# Patient Record
Sex: FEMALE | Race: WHITE | NOT HISPANIC OR LATINO | Employment: OTHER | RURAL
[De-identification: names, ages, dates, MRNs, and addresses within clinical notes are randomized per-mention and may not be internally consistent; named-entity substitution may affect disease eponyms.]

---

## 2023-03-16 ENCOUNTER — HOSPITAL ENCOUNTER (EMERGENCY)
Facility: HOSPITAL | Age: 33
Discharge: HOME | End: 2023-03-16
Payer: MEDICAID

## 2023-03-16 VITALS
SYSTOLIC BLOOD PRESSURE: 131 MMHG | DIASTOLIC BLOOD PRESSURE: 58 MMHG | RESPIRATION RATE: 17 BRPM | TEMPERATURE: 98 F | HEIGHT: 65 IN | BODY MASS INDEX: 24.46 KG/M2 | OXYGEN SATURATION: 100 % | HEART RATE: 89 BPM | WEIGHT: 146.81 LBS

## 2023-03-16 DIAGNOSIS — S61.501A OPEN WOUND OF RIGHT WRIST, INITIAL ENCOUNTER: Primary | ICD-10-CM

## 2023-03-16 PROCEDURE — 99284 PR EMERGENCY DEPT VISIT,LEVEL IV: ICD-10-PCS | Mod: ,,, | Performed by: NURSE PRACTITIONER

## 2023-03-16 PROCEDURE — 25000003 PHARM REV CODE 250: Performed by: NURSE PRACTITIONER

## 2023-03-16 PROCEDURE — 99284 EMERGENCY DEPT VISIT MOD MDM: CPT

## 2023-03-16 PROCEDURE — 99284 EMERGENCY DEPT VISIT MOD MDM: CPT | Mod: ,,, | Performed by: NURSE PRACTITIONER

## 2023-03-16 RX ORDER — CLINDAMYCIN HYDROCHLORIDE 300 MG/1
300 CAPSULE ORAL EVERY 8 HOURS
Qty: 30 CAPSULE | Refills: 0 | Status: SHIPPED | OUTPATIENT
Start: 2023-03-16 | End: 2023-05-14 | Stop reason: CLARIF

## 2023-03-16 RX ORDER — MUPIROCIN 20 MG/G
OINTMENT TOPICAL 3 TIMES DAILY
Qty: 30 G | Refills: 0 | Status: SHIPPED | OUTPATIENT
Start: 2023-03-16 | End: 2023-05-14 | Stop reason: CLARIF

## 2023-03-16 RX ADMIN — BACITRACIN ZINC, NEOMYCIN, POLYMYXIN B: 400; 3.5; 5 OINTMENT TOPICAL at 09:03

## 2023-03-16 NOTE — ED NOTES
Pt discharged home with wound care and medication directions. Pt wound was dressed with neosporin, covered with telfa and covered with kerlix.  Pt instructed to take all antibiotics until gone and follow up with PCP or return to ER for any s/s of infection.

## 2023-03-16 NOTE — ED TRIAGE NOTES
"32 year old female presents to ER with c/o "possible mosquito bite" to right forearm close to wrist. Large open wound noted to area. Pt states it festered up and she mashed it.  "

## 2023-03-16 NOTE — ED PROVIDER NOTES
Encounter Date: 3/16/2023       History   No chief complaint on file.    31 y/o WF presents pov with c/o abscess to right wrist x 2 days.    Review of patient's allergies indicates:  No Known Allergies  History reviewed. No pertinent past medical history.  History reviewed. No pertinent surgical history.  History reviewed. No pertinent family history.  Social History     Tobacco Use    Smoking status: Every Day     Packs/day: 1.00     Types: Cigarettes    Smokeless tobacco: Never   Substance Use Topics    Alcohol use: Not Currently    Drug use: Not Currently     Review of Systems   Respiratory:  Negative for apnea, cough, choking, chest tightness, shortness of breath, wheezing and stridor.    Cardiovascular:  Negative for chest pain, palpitations and leg swelling.   Skin:  Positive for wound.        Right wrist   All other systems reviewed and are negative.    Physical Exam     Initial Vitals [03/16/23 0150]   BP Pulse Resp Temp SpO2   (!) 131/58 89 17 98.4 °F (36.9 °C) 100 %      MAP       --         Physical Exam    Nursing note and vitals reviewed.  Constitutional: She appears well-developed and well-nourished. No distress.   Cardiovascular:  Normal rate, regular rhythm, normal heart sounds and intact distal pulses.     Exam reveals no gallop and no friction rub.       No murmur heard.  Pulmonary/Chest: Breath sounds normal. No respiratory distress. She has no wheezes. She has no rhonchi. She has no rales. She exhibits no tenderness.   Musculoskeletal:         General: Normal range of motion.     Neurological: She is alert and oriented to person, place, and time. She has normal strength.   Skin: Skin is warm and dry. Capillary refill takes less than 2 seconds. Lesion noted.        Psychiatric: She has a normal mood and affect. Her behavior is normal. Judgment and thought content normal.       Medical Screening Exam   See Full Note    ED Course   Procedures  Labs Reviewed - No data to display       Imaging Results     None          Medications   neomycin-bacitracnZn-polymyxnB packet (has no administration in time range)     Medical Decision Making:   Initial Assessment:   31 y/o WF presents pov with c/o abscess to right wrist x 2 days.  Differential Diagnosis:   Abscess of Arm  Staphylococcal Infection  ED Management:  Patient supine on stretcher in ER # 1 in NAD. Patient discharged home. Return precautions and f/u instructions given.                 Clinical Impression:   Final diagnoses:  [S61.501A] Open wound of right wrist, initial encounter (Primary)        ED Disposition Condition    Discharge Stable          ED Prescriptions       Medication Sig Dispense Start Date End Date Auth. Provider    clindamycin (CLEOCIN) 300 MG capsule Take 1 capsule (300 mg total) by mouth every 8 (eight) hours. 30 capsule 3/16/2023 -- EMILY Almeida    mupirocin (BACTROBAN) 2 % ointment Apply topically 3 (three) times daily. 30 g 3/16/2023 -- EMILY Almeida          Follow-up Information    None          EMILY Almeida  03/16/23 0204       EMILY Almeida  03/16/23 0207       EMILY Almeida  03/16/23 0212

## 2023-03-22 ENCOUNTER — OFFICE VISIT (OUTPATIENT)
Dept: FAMILY MEDICINE | Facility: CLINIC | Age: 33
End: 2023-03-22
Payer: MEDICAID

## 2023-03-22 VITALS
SYSTOLIC BLOOD PRESSURE: 127 MMHG | OXYGEN SATURATION: 100 % | HEART RATE: 104 BPM | BODY MASS INDEX: 24.19 KG/M2 | TEMPERATURE: 97 F | RESPIRATION RATE: 20 BRPM | DIASTOLIC BLOOD PRESSURE: 73 MMHG | WEIGHT: 145.19 LBS | HEIGHT: 65 IN

## 2023-03-22 DIAGNOSIS — R00.0 TACHYCARDIA: ICD-10-CM

## 2023-03-22 DIAGNOSIS — T14.8XXA WOUND, OPEN: Primary | ICD-10-CM

## 2023-03-22 PROCEDURE — 1160F RVW MEDS BY RX/DR IN RCRD: CPT | Mod: CPTII,,, | Performed by: STUDENT IN AN ORGANIZED HEALTH CARE EDUCATION/TRAINING PROGRAM

## 2023-03-22 PROCEDURE — 3074F SYST BP LT 130 MM HG: CPT | Mod: CPTII,,, | Performed by: STUDENT IN AN ORGANIZED HEALTH CARE EDUCATION/TRAINING PROGRAM

## 2023-03-22 PROCEDURE — 1159F MED LIST DOCD IN RCRD: CPT | Mod: CPTII,,, | Performed by: STUDENT IN AN ORGANIZED HEALTH CARE EDUCATION/TRAINING PROGRAM

## 2023-03-22 PROCEDURE — 3078F PR MOST RECENT DIASTOLIC BLOOD PRESSURE < 80 MM HG: ICD-10-PCS | Mod: CPTII,,, | Performed by: STUDENT IN AN ORGANIZED HEALTH CARE EDUCATION/TRAINING PROGRAM

## 2023-03-22 PROCEDURE — 1160F PR REVIEW ALL MEDS BY PRESCRIBER/CLIN PHARMACIST DOCUMENTED: ICD-10-PCS | Mod: CPTII,,, | Performed by: STUDENT IN AN ORGANIZED HEALTH CARE EDUCATION/TRAINING PROGRAM

## 2023-03-22 PROCEDURE — 3074F PR MOST RECENT SYSTOLIC BLOOD PRESSURE < 130 MM HG: ICD-10-PCS | Mod: CPTII,,, | Performed by: STUDENT IN AN ORGANIZED HEALTH CARE EDUCATION/TRAINING PROGRAM

## 2023-03-22 PROCEDURE — 3078F DIAST BP <80 MM HG: CPT | Mod: CPTII,,, | Performed by: STUDENT IN AN ORGANIZED HEALTH CARE EDUCATION/TRAINING PROGRAM

## 2023-03-22 PROCEDURE — 3008F BODY MASS INDEX DOCD: CPT | Mod: CPTII,,, | Performed by: STUDENT IN AN ORGANIZED HEALTH CARE EDUCATION/TRAINING PROGRAM

## 2023-03-22 PROCEDURE — 99213 PR OFFICE/OUTPT VISIT, EST, LEVL III, 20-29 MIN: ICD-10-PCS | Mod: ,,, | Performed by: STUDENT IN AN ORGANIZED HEALTH CARE EDUCATION/TRAINING PROGRAM

## 2023-03-22 PROCEDURE — 1159F PR MEDICATION LIST DOCUMENTED IN MEDICAL RECORD: ICD-10-PCS | Mod: CPTII,,, | Performed by: STUDENT IN AN ORGANIZED HEALTH CARE EDUCATION/TRAINING PROGRAM

## 2023-03-22 PROCEDURE — 3008F PR BODY MASS INDEX (BMI) DOCUMENTED: ICD-10-PCS | Mod: CPTII,,, | Performed by: STUDENT IN AN ORGANIZED HEALTH CARE EDUCATION/TRAINING PROGRAM

## 2023-03-22 PROCEDURE — 99213 OFFICE O/P EST LOW 20 MIN: CPT | Mod: ,,, | Performed by: STUDENT IN AN ORGANIZED HEALTH CARE EDUCATION/TRAINING PROGRAM

## 2023-03-23 PROBLEM — R00.0 TACHYCARDIA: Status: ACTIVE | Noted: 2023-03-23

## 2023-03-23 PROBLEM — T14.8XXA WOUND, OPEN: Status: ACTIVE | Noted: 2023-03-23

## 2023-03-23 NOTE — PROGRESS NOTES
"Subjective:       Patient ID: Rosalinda Hennessy is a 32 y.o. female.    Chief Complaint: Follow-up (Pt went to the ER at [facility] for insect bite on right wrist on 3-16-23)    HPI    32-year-old with ulcerated wound on her right posterior forearm; recently seen in [facility] ED and given IM abx, clindamycin, and polymicrobial ointment. She reports it has improved substantially, but it is still not healed.    She denies any fever or chills but is slightly tachycardic.    She denies any illicit substance use.         Objective:    /73 (BP Location: Right arm, Patient Position: Sitting, BP Method: Large (Automatic))   Pulse 104   Temp 97.4 °F (36.3 °C) (Oral)   Resp 20   Ht 5' 5" (1.651 m)   Wt 65.9 kg (145 lb 3.2 oz)   LMP 03/16/2023 (Exact Date)   SpO2 100%   BMI 24.16 kg/m²     Physical Exam    Gen: well-groomed, well-dressed. No apparent distress  HEENT:  NCAT, symmetric  Pulm: normal work of breathing, no accessory muscle use; speaking complete sentences without having to stop  Neuro: CN II-XII grossly normal   Psych: pleasant affect, congruent mood. Linear thought; good eye contact  SKIN: ulcerated wound on her right posterior forearm with overlying ointment; granulation tissue present in wound base with minimal macerated tissue or necrotic tissue. Minimal surrounding erythema.     Assessment:       Problem List Items Addressed This Visit    None  Visit Diagnoses       Wound, open    -  Primary    Relevant Orders    Ambulatory referral/consult to Wound Clinic              Plan:       Unclear etiology but she states this was due to picking. Appears to be healing. Continue clindamycin and daily dressing changes. Supplies provided. Recommend following up with [facility] for wound care until it has healed completely.    For her tachycardia, recommended home monitoring and follow-up if this does not resolve. She thinks it may be due to anxiety.    "

## 2023-05-14 ENCOUNTER — HOSPITAL ENCOUNTER (EMERGENCY)
Facility: HOSPITAL | Age: 33
Discharge: HOME | End: 2023-05-14
Attending: EMERGENCY MEDICINE
Payer: MEDICAID

## 2023-05-14 VITALS
BODY MASS INDEX: 25.99 KG/M2 | OXYGEN SATURATION: 99 % | HEIGHT: 65 IN | WEIGHT: 156 LBS | DIASTOLIC BLOOD PRESSURE: 49 MMHG | TEMPERATURE: 98 F | SYSTOLIC BLOOD PRESSURE: 123 MMHG | HEART RATE: 94 BPM | RESPIRATION RATE: 18 BRPM

## 2023-05-14 DIAGNOSIS — G43.009 MIGRAINE WITHOUT AURA AND WITHOUT STATUS MIGRAINOSUS, NOT INTRACTABLE: Primary | ICD-10-CM

## 2023-05-14 PROCEDURE — 96372 THER/PROPH/DIAG INJ SC/IM: CPT | Performed by: EMERGENCY MEDICINE

## 2023-05-14 PROCEDURE — 99284 EMERGENCY DEPT VISIT MOD MDM: CPT

## 2023-05-14 PROCEDURE — 63600175 PHARM REV CODE 636 W HCPCS: Performed by: EMERGENCY MEDICINE

## 2023-05-14 PROCEDURE — 99284 PR EMERGENCY DEPT VISIT,LEVEL IV: ICD-10-PCS | Mod: ,,, | Performed by: EMERGENCY MEDICINE

## 2023-05-14 PROCEDURE — 99284 EMERGENCY DEPT VISIT MOD MDM: CPT | Mod: ,,, | Performed by: EMERGENCY MEDICINE

## 2023-05-14 RX ORDER — PROMETHAZINE HYDROCHLORIDE 25 MG/1
25 SUPPOSITORY RECTAL EVERY 6 HOURS PRN
Qty: 12 SUPPOSITORY | Refills: 0 | Status: SHIPPED | OUTPATIENT
Start: 2023-05-14

## 2023-05-14 RX ORDER — PROMETHAZINE HYDROCHLORIDE 25 MG/1
25 TABLET ORAL EVERY 6 HOURS PRN
Qty: 16 TABLET | Refills: 0 | Status: SHIPPED | OUTPATIENT
Start: 2023-05-14

## 2023-05-14 RX ORDER — PROMETHAZINE HYDROCHLORIDE 25 MG/ML
25 INJECTION, SOLUTION INTRAMUSCULAR; INTRAVENOUS
Status: COMPLETED | OUTPATIENT
Start: 2023-05-14 | End: 2023-05-14

## 2023-05-14 RX ORDER — TRAZODONE HYDROCHLORIDE 100 MG/1
100 TABLET ORAL NIGHTLY
COMMUNITY
End: 2023-07-10 | Stop reason: SDUPTHER

## 2023-05-14 RX ADMIN — PROMETHAZINE HYDROCHLORIDE 25 MG: 25 INJECTION INTRAMUSCULAR; INTRAVENOUS at 07:05
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
## 2023-05-14 NOTE — DISCHARGE INSTRUCTIONS
Take Phenergan as prescribed for nausea or headache.  Return to emergency department for any worsening or further problems.  Follow up in clinic with primary care provider in 2-3 days if symptoms persist.

## 2023-07-10 ENCOUNTER — OFFICE VISIT (OUTPATIENT)
Dept: FAMILY MEDICINE | Facility: CLINIC | Age: 33
End: 2023-07-10
Payer: MEDICAID

## 2023-07-10 VITALS
OXYGEN SATURATION: 99 % | HEART RATE: 73 BPM | SYSTOLIC BLOOD PRESSURE: 101 MMHG | WEIGHT: 158 LBS | BODY MASS INDEX: 26.33 KG/M2 | TEMPERATURE: 98 F | HEIGHT: 65 IN | DIASTOLIC BLOOD PRESSURE: 65 MMHG

## 2023-07-10 DIAGNOSIS — F19.90 SUBSTANCE USE DISORDER: ICD-10-CM

## 2023-07-10 DIAGNOSIS — R51.9 FREQUENT HEADACHES: ICD-10-CM

## 2023-07-10 DIAGNOSIS — F43.9 STRESS: ICD-10-CM

## 2023-07-10 DIAGNOSIS — R45.86 MOOD CHANGES: ICD-10-CM

## 2023-07-10 PROCEDURE — 99213 PR OFFICE/OUTPT VISIT, EST, LEVL III, 20-29 MIN: ICD-10-PCS | Mod: ,,, | Performed by: STUDENT IN AN ORGANIZED HEALTH CARE EDUCATION/TRAINING PROGRAM

## 2023-07-10 PROCEDURE — 3078F PR MOST RECENT DIASTOLIC BLOOD PRESSURE < 80 MM HG: ICD-10-PCS | Mod: CPTII,,, | Performed by: STUDENT IN AN ORGANIZED HEALTH CARE EDUCATION/TRAINING PROGRAM

## 2023-07-10 PROCEDURE — 3074F SYST BP LT 130 MM HG: CPT | Mod: CPTII,,, | Performed by: STUDENT IN AN ORGANIZED HEALTH CARE EDUCATION/TRAINING PROGRAM

## 2023-07-10 PROCEDURE — 1160F PR REVIEW ALL MEDS BY PRESCRIBER/CLIN PHARMACIST DOCUMENTED: ICD-10-PCS | Mod: CPTII,,, | Performed by: STUDENT IN AN ORGANIZED HEALTH CARE EDUCATION/TRAINING PROGRAM

## 2023-07-10 PROCEDURE — 99213 OFFICE O/P EST LOW 20 MIN: CPT | Mod: ,,, | Performed by: STUDENT IN AN ORGANIZED HEALTH CARE EDUCATION/TRAINING PROGRAM

## 2023-07-10 PROCEDURE — 3008F PR BODY MASS INDEX (BMI) DOCUMENTED: ICD-10-PCS | Mod: CPTII,,, | Performed by: STUDENT IN AN ORGANIZED HEALTH CARE EDUCATION/TRAINING PROGRAM

## 2023-07-10 PROCEDURE — 3074F PR MOST RECENT SYSTOLIC BLOOD PRESSURE < 130 MM HG: ICD-10-PCS | Mod: CPTII,,, | Performed by: STUDENT IN AN ORGANIZED HEALTH CARE EDUCATION/TRAINING PROGRAM

## 2023-07-10 PROCEDURE — 1159F MED LIST DOCD IN RCRD: CPT | Mod: CPTII,,, | Performed by: STUDENT IN AN ORGANIZED HEALTH CARE EDUCATION/TRAINING PROGRAM

## 2023-07-10 PROCEDURE — 1160F RVW MEDS BY RX/DR IN RCRD: CPT | Mod: CPTII,,, | Performed by: STUDENT IN AN ORGANIZED HEALTH CARE EDUCATION/TRAINING PROGRAM

## 2023-07-10 PROCEDURE — 3008F BODY MASS INDEX DOCD: CPT | Mod: CPTII,,, | Performed by: STUDENT IN AN ORGANIZED HEALTH CARE EDUCATION/TRAINING PROGRAM

## 2023-07-10 PROCEDURE — 1159F PR MEDICATION LIST DOCUMENTED IN MEDICAL RECORD: ICD-10-PCS | Mod: CPTII,,, | Performed by: STUDENT IN AN ORGANIZED HEALTH CARE EDUCATION/TRAINING PROGRAM

## 2023-07-10 PROCEDURE — 3078F DIAST BP <80 MM HG: CPT | Mod: CPTII,,, | Performed by: STUDENT IN AN ORGANIZED HEALTH CARE EDUCATION/TRAINING PROGRAM

## 2023-07-10 RX ORDER — NALTREXONE HYDROCHLORIDE 50 MG/1
50 TABLET, FILM COATED ORAL DAILY PRN
Qty: 30 TABLET | Refills: 1 | Status: SHIPPED | OUTPATIENT
Start: 2023-07-10

## 2023-07-10 RX ORDER — NALTREXONE HYDROCHLORIDE 50 MG/1
25 TABLET, FILM COATED ORAL DAILY PRN
COMMUNITY
Start: 2023-06-08 | End: 2023-07-10 | Stop reason: SDUPTHER

## 2023-07-10 RX ORDER — VENLAFAXINE 37.5 MG/1
37.5 TABLET ORAL DAILY
Qty: 30 TABLET | Refills: 5 | Status: SHIPPED | OUTPATIENT
Start: 2023-07-10 | End: 2024-07-09

## 2023-07-10 RX ORDER — TRAZODONE HYDROCHLORIDE 100 MG/1
100 TABLET ORAL NIGHTLY
Qty: 30 TABLET | Refills: 1 | Status: SHIPPED | OUTPATIENT
Start: 2023-07-10

## 2023-07-10 NOTE — PROGRESS NOTES
"Subjective     Patient ID: Rosalinda Hennessy is a 32 y.o. female.    Chief Complaint: Medication Refill    HPI    32-year-old woman with the medical problems listed below who comes to clinic for refill of their medication and follow-up for their chronic medical conditions.    She recently received treatment for substance use disorder and reports a past history of methamphetamine use.     She has been taking naltrexone since her discharge and this has helped with her craving.     She is also requesting a refill for venlafaxine and trazodone which she has been taking for mood and sleep. She reports this is helpful.    She has taken prozac and lexapro in the past, neither of which have been substantially helpful.    She was recommended to f/u with psych care at [place] but has unable to set up an appointment yet; she plans to do this.     She also reports a history of headaches. Exacerbated by stress. Has to go lay down and has nausea. Anxiety is a trigger too. Being around a lot of people is a trigger. Pain is occipital. They occur frequently. Will take one excedrin and a BC powder, which may help a bit but doesn't make it go away. Denies any aura.      Objective   /65   Pulse 73   Temp 98.3 °F (36.8 °C) (Oral)   Ht 5' 5" (1.651 m)   Wt 71.7 kg (158 lb)   LMP 07/01/2023 (Approximate)   SpO2 99%   BMI 26.29 kg/m²     Physical Exam  Gen: well-groomed, well-dressed. No apparent distress  HEENT:  NCAT, symmetric  Pulm: normal work of breathing, no accessory muscle use; speaking complete sentences without having to stop  Neuro: CN II-XII grossly normal   Psych: pleasant affect, congruent mood. Linear thought; good eye contact  SKIN: no rashes present on face, neck, hands         Assessment and Plan     Problem List Items Addressed This Visit       Stress    Mood changes    Frequent headaches    Substance use disorder     Stress - contributing to headache; f/u with [place] to discuss; may benefit from CCM  "   Venlafaxine and trazodone are helping; recommend follow-up with [facility] to discuss further treatment  Continue prn NSAIDs / Tylenol; record HA diary. Caution against medication overuse headache. Counseled that her s/s do not suggest migraine so abortive therapy with triptans or other similar unlikely to help. Will continue to monitor, assess for contributing factors, and treat accordingly. Briefly discussed ppx therapy but more investigation is needed to identify type and etiology  Methamphetamine use - in remission; continue natlrexone and f/u with [facility] for continued management

## 2023-07-11 ENCOUNTER — TELEPHONE (OUTPATIENT)
Dept: FAMILY MEDICINE | Facility: CLINIC | Age: 33
End: 2023-07-11
Payer: MEDICAID

## 2023-07-11 NOTE — TELEPHONE ENCOUNTER
Rx change to [facility]-Change Effexor to Effexor XR. Pt. to return Effexor Rx picked up yesterday to [facility].

## 2023-07-12 PROBLEM — R45.86 MOOD CHANGES: Status: ACTIVE | Noted: 2023-07-12

## 2023-07-12 PROBLEM — R51.9 FREQUENT HEADACHES: Status: ACTIVE | Noted: 2023-07-12

## 2023-07-12 PROBLEM — F43.9 STRESS: Status: ACTIVE | Noted: 2023-07-12

## 2023-07-12 PROBLEM — F19.90 SUBSTANCE USE DISORDER: Status: ACTIVE | Noted: 2023-07-12

## 2024-03-15 ENCOUNTER — HOSPITAL ENCOUNTER (EMERGENCY)
Facility: HOSPITAL | Age: 34
Discharge: HOME | End: 2024-03-15
Payer: COMMERCIAL

## 2024-03-15 VITALS
DIASTOLIC BLOOD PRESSURE: 76 MMHG | BODY MASS INDEX: 29.16 KG/M2 | HEART RATE: 100 BPM | WEIGHT: 175 LBS | TEMPERATURE: 98 F | RESPIRATION RATE: 16 BRPM | HEIGHT: 65 IN | OXYGEN SATURATION: 98 % | SYSTOLIC BLOOD PRESSURE: 127 MMHG
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
## 2024-03-15 DIAGNOSIS — S32.10XA CLOSED FRACTURE OF SACRUM, UNSPECIFIED PORTION OF SACRUM, INITIAL ENCOUNTER: Primary | ICD-10-CM

## 2024-03-15 LAB
BASOPHILS # BLD AUTO: 0.04 K/UL (ref 0–0.2)
BASOPHILS NFR BLD AUTO: 0.5 % (ref 0–1)
BILIRUB UR QL STRIP: NEGATIVE
CLARITY UR: CLEAR
COLOR UR: YELLOW
DIFFERENTIAL METHOD BLD: ABNORMAL
EOSINOPHIL # BLD AUTO: 0.06 K/UL (ref 0–0.5)
EOSINOPHIL NFR BLD AUTO: 0.7 % (ref 1–4)
ERYTHROCYTE [DISTWIDTH] IN BLOOD BY AUTOMATED COUNT: 12.1 % (ref 11.5–14.5)
GLUCOSE UR STRIP-MCNC: NEGATIVE MG/DL
HCT VFR BLD AUTO: 40.2 % (ref 38–47)
HGB BLD-MCNC: 13.7 G/DL (ref 12–16)
KETONES UR STRIP-SCNC: NEGATIVE MG/DL
LEUKOCYTE ESTERASE UR QL STRIP: NEGATIVE
LYMPHOCYTES # BLD AUTO: 2.78 K/UL (ref 1–4.8)
LYMPHOCYTES NFR BLD AUTO: 34.4 % (ref 27–41)
MCH RBC QN AUTO: 30.9 PG (ref 27–31)
MCHC RBC AUTO-ENTMCNC: 34.1 G/DL (ref 32–36)
MCV RBC AUTO: 90.5 FL (ref 80–96)
MONOCYTES # BLD AUTO: 0.67 K/UL (ref 0–0.8)
MONOCYTES NFR BLD AUTO: 8.3 % (ref 2–6)
MPC BLD CALC-MCNC: 10.8 FL (ref 9.4–12.4)
NEUTROPHILS # BLD AUTO: 4.52 K/UL (ref 1.8–7.7)
NEUTROPHILS NFR BLD AUTO: 56.1 % (ref 53–65)
NITRITE UR QL STRIP: NEGATIVE
PH UR STRIP: 6 PH UNITS
PLATELET # BLD AUTO: 366 K/UL (ref 150–400)
PROT UR QL STRIP: NEGATIVE
RBC # BLD AUTO: 4.44 M/UL (ref 4.2–5.4)
RBC # UR STRIP: NEGATIVE /UL
SP GR UR STRIP: <=1.005
UROBILINOGEN UR STRIP-ACNC: 0.2 MG/DL
WBC # BLD AUTO: 8.07 K/UL (ref 4.5–11)

## 2024-03-15 PROCEDURE — 81003 URINALYSIS AUTO W/O SCOPE: CPT | Performed by: NURSE PRACTITIONER

## 2024-03-15 PROCEDURE — 63600175 PHARM REV CODE 636 W HCPCS: Performed by: NURSE PRACTITIONER

## 2024-03-15 PROCEDURE — 99284 EMERGENCY DEPT VISIT MOD MDM: CPT | Mod: ,,, | Performed by: NURSE PRACTITIONER

## 2024-03-15 PROCEDURE — 99284 EMERGENCY DEPT VISIT MOD MDM: CPT | Mod: 25

## 2024-03-15 PROCEDURE — 96374 THER/PROPH/DIAG INJ IV PUSH: CPT

## 2024-03-15 PROCEDURE — G0390 TRAUMA RESPONS W/HOSP CRITI: HCPCS

## 2024-03-15 PROCEDURE — 85025 COMPLETE CBC W/AUTO DIFF WBC: CPT | Performed by: NURSE PRACTITIONER

## 2024-03-15 RX ORDER — KETOROLAC TROMETHAMINE 30 MG/ML
15 INJECTION, SOLUTION INTRAMUSCULAR; INTRAVENOUS
Status: COMPLETED | OUTPATIENT
Start: 2024-03-15 | End: 2024-03-15

## 2024-03-15 RX ORDER — NAPROXEN 250 MG/1
250 TABLET ORAL
Qty: 30 TABLET | Refills: 0 | Status: SHIPPED | OUTPATIENT
Start: 2024-03-15

## 2024-03-15 RX ADMIN — KETOROLAC TROMETHAMINE 15 MG: 30 INJECTION, SOLUTION INTRAMUSCULAR; INTRAVENOUS at 06:03
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
## 2024-03-15 NOTE — Clinical Note
"Rosalinda Samaniegoantha" Gerhard was seen and treated in our emergency department on 3/15/2024.  She may return to work on 03/18/2024.       If you have any questions or concerns, please don't hesitate to call.      Dwight Powell, EMILY"

## 2024-03-16 NOTE — ED NOTES
Pt discharged home with verbal understanding of discharge instructions.  Pt stable upon discharge.

## 2024-06-24 ENCOUNTER — HOSPITAL ENCOUNTER (EMERGENCY)
Facility: HOSPITAL | Age: 34
Discharge: HOME | End: 2024-06-24

## 2024-06-24 VITALS
TEMPERATURE: 100 F | BODY MASS INDEX: 26.99 KG/M2 | HEIGHT: 65 IN | WEIGHT: 162 LBS | SYSTOLIC BLOOD PRESSURE: 130 MMHG | DIASTOLIC BLOOD PRESSURE: 66 MMHG | HEART RATE: 106 BPM | RESPIRATION RATE: 18 BRPM | OXYGEN SATURATION: 95 %

## 2024-06-24 DIAGNOSIS — J02.0 STREP PHARYNGITIS: Primary | ICD-10-CM

## 2024-06-24 LAB — GROUP A STREP MOLECULAR (OHS): POSITIVE

## 2024-06-24 PROCEDURE — 87651 STREP A DNA AMP PROBE: CPT | Performed by: NURSE PRACTITIONER

## 2024-06-24 PROCEDURE — 99284 EMERGENCY DEPT VISIT MOD MDM: CPT | Mod: ,,, | Performed by: NURSE PRACTITIONER

## 2024-06-24 PROCEDURE — 99283 EMERGENCY DEPT VISIT LOW MDM: CPT

## 2024-06-24 RX ORDER — AMOXICILLIN 500 MG/1
500 CAPSULE ORAL 3 TIMES DAILY
Qty: 21 CAPSULE | Refills: 0 | Status: SHIPPED | OUTPATIENT
Start: 2024-06-24 | End: 2024-07-01

## 2024-06-24 RX ORDER — AMOXICILLIN 500 MG/1
500 CAPSULE ORAL 3 TIMES DAILY
Qty: 21 CAPSULE | Refills: 0 | Status: SHIPPED | OUTPATIENT
Start: 2024-06-24 | End: 2024-06-24

## 2024-06-24 NOTE — ED TRIAGE NOTES
Pt presents with c/o sore throat. Son was dx with strep yesterday. Pt started hurting a few hours ago. Took 5 motrin PTA.

## 2024-06-24 NOTE — ED PROVIDER NOTES
Encounter Date: 6/24/2024       History     Chief Complaint   Patient presents with    Sore Throat     33 yr old WF with c/o sore throat and fever tonight.  States son with strep    The history is provided by the patient.   Sore Throat   This is a new problem. The sore throat symptoms include sore throat.The current episode started today. The problem has been unchanged. The maximum temperature recorded prior to her arrival was 100 - 100.9 F. The fever has been present for Less than 1 day. She has had exposure to strep. She has tried nothing for the symptoms.     Review of patient's allergies indicates:  No Known Allergies  Past Medical History:   Diagnosis Date    Asthma      History reviewed. No pertinent surgical history.  No family history on file.  Social History     Tobacco Use    Smoking status: Every Day     Current packs/day: 1.00     Types: Cigarettes    Smokeless tobacco: Never   Substance Use Topics    Alcohol use: Not Currently    Drug use: Not Currently     Review of Systems   Constitutional: Negative.    HENT:  Positive for sore throat.    Respiratory: Negative.     Cardiovascular: Negative.        Physical Exam     Initial Vitals [06/24/24 0146]   BP Pulse Resp Temp SpO2   130/66 106 18 99.7 °F (37.6 °C) 95 %      MAP       --         Physical Exam    Constitutional: She appears well-developed and well-nourished.   HENT:   Mouth/Throat: Posterior oropharyngeal erythema present.   Cardiovascular:  Normal rate and regular rhythm.           Pulmonary/Chest: Breath sounds normal.     Skin: Skin is warm and dry.         Medical Screening Exam   See Full Note    ED Course   Procedures  Labs Reviewed   STREP A BY MOLECULAR METHOD - Abnormal; Notable for the following components:       Result Value    Group A Strep Molecular Positive (*)     All other components within normal limits          Imaging Results    None          Medications - No data to display  Medical Decision Making  33 yr old WF with c/o sore  throat and fever tonight.  States son with strep      Amount and/or Complexity of Data Reviewed  Labs: ordered.     Details: Positive Strep pharyngitis    Risk  Prescription drug management.                                      Clinical Impression:   Final diagnoses:  [J02.0] Strep pharyngitis (Primary)        ED Disposition Condition    Discharge Stable          ED Prescriptions       Medication Sig Dispense Start Date End Date Auth. Provider    amoxicillin (AMOXIL) 500 MG capsule  (Status: Discontinued) Take 1 capsule (500 mg total) by mouth 3 (three) times daily. for 7 days 21 capsule 6/24/2024 6/24/2024 Karly Alejandro FNP    amoxicillin (AMOXIL) 500 MG capsule Take 1 capsule (500 mg total) by mouth 3 (three) times daily. for 7 days 21 capsule 6/24/2024 7/1/2024 Karly Alejandro FNP          Follow-up Information       Follow up With Specialties Details Why Contact Info    Felix Griggs MD Geriatric Medicine   [address] [facility] [address] [phone]               Karly Alejandro FNP  06/24/24 7761

## 2024-06-24 NOTE — DISCHARGE INSTRUCTIONS
Take antibiotic as directed.  Take tylenol or ibuprofen as directed if needed for fever.  Follow up with your primary care provider or [facility].  Return for emergency needs

## 2024-09-26 ENCOUNTER — OFFICE VISIT (OUTPATIENT)
Dept: FAMILY MEDICINE | Facility: CLINIC | Age: 34
End: 2024-09-26
Payer: MEDICAID

## 2024-09-26 VITALS
DIASTOLIC BLOOD PRESSURE: 78 MMHG | BODY MASS INDEX: 28.49 KG/M2 | OXYGEN SATURATION: 96 % | RESPIRATION RATE: 20 BRPM | HEART RATE: 82 BPM | WEIGHT: 171 LBS | SYSTOLIC BLOOD PRESSURE: 119 MMHG | HEIGHT: 65 IN | TEMPERATURE: 98 F

## 2024-09-26 DIAGNOSIS — F51.5 NIGHTMARES: ICD-10-CM

## 2024-09-26 DIAGNOSIS — F41.9 ANXIETY: Primary | ICD-10-CM

## 2024-09-26 PROCEDURE — 1159F MED LIST DOCD IN RCRD: CPT | Mod: CPTII,,, | Performed by: FAMILY MEDICINE

## 2024-09-26 PROCEDURE — 99213 OFFICE O/P EST LOW 20 MIN: CPT | Mod: ,,, | Performed by: FAMILY MEDICINE

## 2024-09-26 PROCEDURE — 3074F SYST BP LT 130 MM HG: CPT | Mod: CPTII,,, | Performed by: FAMILY MEDICINE

## 2024-09-26 PROCEDURE — 3078F DIAST BP <80 MM HG: CPT | Mod: CPTII,,, | Performed by: FAMILY MEDICINE

## 2024-09-26 PROCEDURE — 3008F BODY MASS INDEX DOCD: CPT | Mod: CPTII,,, | Performed by: FAMILY MEDICINE

## 2024-09-26 RX ORDER — BUSPIRONE HYDROCHLORIDE 5 MG/1
5 TABLET ORAL 2 TIMES DAILY
Qty: 180 TABLET | Refills: 1 | Status: SHIPPED | OUTPATIENT
Start: 2024-09-26 | End: 2025-03-25

## 2024-09-26 RX ORDER — DULOXETIN HYDROCHLORIDE 30 MG/1
30 CAPSULE, DELAYED RELEASE ORAL DAILY
Qty: 90 CAPSULE | Refills: 1 | Status: SHIPPED | OUTPATIENT
Start: 2024-09-26 | End: 2025-03-25

## 2024-09-26 NOTE — PROGRESS NOTES
"Sandy Butcher DO        PATIENT NAME: Rosalinda Hennessy  : 1990  DATE: 24  MRN: [id number]      Reason for Visit / Chief Complaint: Panic Attack (Has had for a couple of years now but it is getting worse )     History of Present Illness / Problem Focused Workflow     Rosalinda Hennessy presents to the clinic with Panic Attack (Has had for a couple of years now but it is getting worse )     Presents here for "panic attacks" which she has dealt with for years   Describes these attacks as increased overwhelming and racing thoughts  States that they seem to be getting worse   Reports she recently got all four of her children back from [facility] and reports feeling overwhelmed at times   Denies any SI or HI or any thoughts of self-harm or harm to others    Has tried Prozac, Celeza, Zoloft, and Lexapro in the past   Reports she had to stop each of these medications due to side effects.  Also reports having nightmares as well.        Review of Systems     Review of Systems   Constitutional:  Negative for chills and fever.   HENT:  Negative for sore throat.    Respiratory:  Negative for shortness of breath and wheezing.    Cardiovascular:  Negative for chest pain and palpitations.   Gastrointestinal:  Negative for abdominal pain, nausea and vomiting.        Medical / Social / Family History     Past Medical History:   Diagnosis Date    Asthma     Mild intermittent asthma, uncomplicated        Past Surgical History:   Procedure Laterality Date    tubial         Social History  Ms. Rosalinda Hennessy  reports that she has been smoking cigarettes. She has never used smokeless tobacco. She reports that she does not currently use alcohol. She reports that she does not currently use drugs.    Family History  Ms. Rosalinda Hennessy's family history includes Lung cancer in her maternal grandmother; Throat cancer in her mother.    Medications and Allergies     Medications  No outpatient medications have been marked as taking " for the 9/26/24 encounter (Office Visit) with Sandy Butcher DO.       Allergies  Review of patient's allergies indicates:  No Known Allergies    Physical Examination     Vitals:    09/26/24 0844   BP: 119/78   Pulse: 82   Resp: 20   Temp: 98.4 °F (36.9 °C)     Physical Exam  Constitutional:       General: She is not in acute distress.     Appearance: Normal appearance.   HENT:      Head: Normocephalic and atraumatic.   Cardiovascular:      Rate and Rhythm: Normal rate and regular rhythm.      Pulses: Normal pulses.      Heart sounds: Normal heart sounds. No murmur heard.  Pulmonary:      Effort: Pulmonary effort is normal.      Breath sounds: Normal breath sounds. No wheezing, rhonchi or rales.   Neurological:      Mental Status: She is alert.        Assessment and Plan (including Health Maintenance)     Plan:   Anxiety  Nightmares  Denies any SI or HI at this time  Considering patient has tried various SSRIs in the past, we will trial SNRI Duloxetine once daily along with BuSpar twice daily.  Discussed potential side effects associated with these medications.    Patient agreeable to starting these medicines    Follow-up in 1 month for anxiety.    Signature:  Sandy Butcher DO      Date of encounter: 9/26/24

## 2024-09-26 NOTE — PROGRESS NOTES
Health Maintenance Due   Topic Date Due    Cervical Cancer Screening  Never done    Lipid Panel  Never done    Pneumococcal Vaccines (Age 0-64) (1 of 2 - PCV) Never done    HIV Screening  Never done    TETANUS VACCINE  Never done    Influenza Vaccine (1) 09/01/2024    COVID-19 Vaccine (1 - 2023-24 season) Never done       Declines at this time

## 2024-09-26 NOTE — PATIENT INSTRUCTIONS
I would recommend setting up appointment with psychiatrist as well to help with medication management.     [place] [id number] [facility],   [place] [address]   Ph: [phone]    [facility]  [facility]  [address] [place] [address] [address]  [place] [address]  Ph: [phone]    [facility]  [address] [place] [address]  Ph: [phone]

## 2024-10-25 ENCOUNTER — OFFICE VISIT (OUTPATIENT)
Dept: FAMILY MEDICINE | Facility: CLINIC | Age: 34
End: 2024-10-25
Payer: MEDICAID

## 2024-10-25 VITALS
DIASTOLIC BLOOD PRESSURE: 72 MMHG | BODY MASS INDEX: 28.99 KG/M2 | OXYGEN SATURATION: 95 % | SYSTOLIC BLOOD PRESSURE: 117 MMHG | WEIGHT: 174 LBS | TEMPERATURE: 99 F | HEIGHT: 65 IN | HEART RATE: 86 BPM

## 2024-10-25 DIAGNOSIS — Z23 NEED FOR VACCINATION: ICD-10-CM

## 2024-10-25 DIAGNOSIS — F41.9 ANXIETY: Primary | ICD-10-CM

## 2024-10-25 DIAGNOSIS — Z11.4 SCREENING FOR HIV (HUMAN IMMUNODEFICIENCY VIRUS): ICD-10-CM

## 2024-10-25 DIAGNOSIS — Z13.220 SCREENING FOR LIPID DISORDERS: ICD-10-CM

## 2024-10-25 DIAGNOSIS — Z11.59 ENCOUNTER FOR HEPATITIS C SCREENING TEST FOR LOW RISK PATIENT: ICD-10-CM

## 2024-10-25 LAB
CHOLEST SERPL-MCNC: 177 MG/DL (ref 0–200)
CHOLEST/HDLC SERPL: 3.8 {RATIO}
HCV AB SER QL: NORMAL
HDLC SERPL-MCNC: 46 MG/DL (ref 40–60)
HIV 1+O+2 AB SERPL QL: NORMAL
LDLC SERPL CALC-MCNC: 97 MG/DL
LDLC/HDLC SERPL: 2.1 {RATIO}
NONHDLC SERPL-MCNC: 131 MG/DL
TRIGL SERPL-MCNC: 169 MG/DL (ref 35–150)
VLDLC SERPL-MCNC: 34 MG/DL

## 2024-10-25 PROCEDURE — 3078F DIAST BP <80 MM HG: CPT | Mod: CPTII,,, | Performed by: FAMILY MEDICINE

## 2024-10-25 PROCEDURE — 99214 OFFICE O/P EST MOD 30 MIN: CPT | Mod: 25,,, | Performed by: FAMILY MEDICINE

## 2024-10-25 PROCEDURE — 86803 HEPATITIS C AB TEST: CPT | Mod: ,,, | Performed by: CLINICAL MEDICAL LABORATORY

## 2024-10-25 PROCEDURE — 3074F SYST BP LT 130 MM HG: CPT | Mod: CPTII,,, | Performed by: FAMILY MEDICINE

## 2024-10-25 PROCEDURE — 1159F MED LIST DOCD IN RCRD: CPT | Mod: CPTII,,, | Performed by: FAMILY MEDICINE

## 2024-10-25 PROCEDURE — 87389 HIV-1 AG W/HIV-1&-2 AB AG IA: CPT | Mod: ,,, | Performed by: CLINICAL MEDICAL LABORATORY

## 2024-10-25 PROCEDURE — 90472 IMMUNIZATION ADMIN EACH ADD: CPT | Mod: ,,, | Performed by: FAMILY MEDICINE

## 2024-10-25 PROCEDURE — 90471 IMMUNIZATION ADMIN: CPT | Mod: ,,, | Performed by: FAMILY MEDICINE

## 2024-10-25 PROCEDURE — 90656 IIV3 VACC NO PRSV 0.5 ML IM: CPT | Mod: ,,, | Performed by: FAMILY MEDICINE

## 2024-10-25 PROCEDURE — 3008F BODY MASS INDEX DOCD: CPT | Mod: CPTII,,, | Performed by: FAMILY MEDICINE

## 2024-10-25 PROCEDURE — 80061 LIPID PANEL: CPT | Mod: ,,, | Performed by: CLINICAL MEDICAL LABORATORY

## 2024-10-25 PROCEDURE — 90677 PCV20 VACCINE IM: CPT | Mod: ,,, | Performed by: FAMILY MEDICINE

## 2024-12-07 ENCOUNTER — HOSPITAL ENCOUNTER (EMERGENCY)
Facility: HOSPITAL | Age: 34
Discharge: HOME | End: 2024-12-07
Payer: MEDICAID

## 2024-12-07 VITALS
HEART RATE: 87 BPM | DIASTOLIC BLOOD PRESSURE: 75 MMHG | SYSTOLIC BLOOD PRESSURE: 152 MMHG | RESPIRATION RATE: 18 BRPM | TEMPERATURE: 98 F | WEIGHT: 169 LBS | HEIGHT: 65 IN | OXYGEN SATURATION: 98 % | BODY MASS INDEX: 28.16 KG/M2

## 2024-12-07 DIAGNOSIS — R11.10 VOMITING, UNSPECIFIED VOMITING TYPE, UNSPECIFIED WHETHER NAUSEA PRESENT: Primary | ICD-10-CM

## 2024-12-07 DIAGNOSIS — R19.7 DIARRHEA, UNSPECIFIED TYPE: ICD-10-CM

## 2024-12-07 PROCEDURE — 96372 THER/PROPH/DIAG INJ SC/IM: CPT | Performed by: NURSE PRACTITIONER

## 2024-12-07 PROCEDURE — 63600175 PHARM REV CODE 636 W HCPCS: Performed by: NURSE PRACTITIONER

## 2024-12-07 PROCEDURE — 99284 EMERGENCY DEPT VISIT MOD MDM: CPT | Mod: GF,,, | Performed by: NURSE PRACTITIONER

## 2024-12-07 PROCEDURE — 99284 EMERGENCY DEPT VISIT MOD MDM: CPT | Mod: 25

## 2024-12-07 RX ORDER — ONDANSETRON 4 MG/1
4 TABLET, ORALLY DISINTEGRATING ORAL EVERY 6 HOURS PRN
Qty: 10 TABLET | Refills: 0 | Status: SHIPPED | OUTPATIENT
Start: 2024-12-07

## 2024-12-07 RX ORDER — ONDANSETRON HYDROCHLORIDE 2 MG/ML
4 INJECTION, SOLUTION INTRAVENOUS
Status: COMPLETED | OUTPATIENT
Start: 2024-12-07 | End: 2024-12-07

## 2024-12-07 RX ADMIN — ONDANSETRON 4 MG: 2 INJECTION INTRAMUSCULAR; INTRAVENOUS at 06:12

## 2024-12-08 NOTE — ED PROVIDER NOTES
Encounter Date: 12/7/2024       History     Chief Complaint   Patient presents with    Diarrhea    Vomiting    Nausea     34 yr old WF with PMH of asthma to ED with c/o N/V/D after eating a frozen meal today for lunch.      The history is provided by the patient.   Emesis   This is a new problem. The current episode started today. The problem occurs 2 - 4 times per day. The problem has been unchanged. The emesis has an appearance of stomach contents. Associated symptoms include diarrhea. Pertinent negatives include no abdominal pain and no cough. Risk factors include suspect food intake.     Review of patient's allergies indicates:  No Known Allergies  Past Medical History:   Diagnosis Date    Asthma     Mild intermittent asthma, uncomplicated      Past Surgical History:   Procedure Laterality Date    tubial       Family History   Problem Relation Name Age of Onset    Throat cancer Mother      Lung cancer Maternal Grandmother       Social History     Tobacco Use    Smoking status: Every Day     Current packs/day: 1.00     Types: Cigarettes    Smokeless tobacco: Never   Substance Use Topics    Alcohol use: Not Currently    Drug use: Not Currently     Review of Systems   Constitutional: Negative.    Respiratory:  Negative for cough.    Cardiovascular: Negative.    Gastrointestinal:  Positive for diarrhea, nausea and vomiting. Negative for abdominal pain.   Genitourinary: Negative.        Physical Exam     Initial Vitals [12/07/24 1845]   BP Pulse Resp Temp SpO2   (!) 126/59 77 16 98.2 °F (36.8 °C) 98 %      MAP       --         Physical Exam    Nursing note and vitals reviewed.  Constitutional: She appears well-developed and well-nourished.   Cardiovascular:  Normal rate and regular rhythm.           Pulmonary/Chest: Breath sounds normal.   Abdominal: Abdomen is soft. There is no abdominal tenderness.     Neurological: She is alert and oriented to person, place, and time. GCS score is 15. GCS eye subscore is 4. GCS  verbal subscore is 5. GCS motor subscore is 6.   Skin: Skin is warm and dry.         Medical Screening Exam   See Full Note    ED Course   Procedures  Labs Reviewed - No data to display       Imaging Results    None          Medications   ondansetron injection 4 mg (4 mg Intramuscular Given 12/7/24 1853)     Medical Decision Making  34 yr old WF with PMH of asthma to ED with c/o N/V/D after eating a frozen meal today for lunch.     Risk  Prescription drug management.               ED Course as of 12/07/24 1906   Sat Dec 07, 2024   1903 Reports nausea relieved with zofran [CG]      ED Course User Index  [CG] Karly Alejandro FNP                           Clinical Impression:   Final diagnoses:  [R11.10] Vomiting, unspecified vomiting type, unspecified whether nausea present (Primary)  [R19.7] Diarrhea, unspecified type        ED Disposition Condition    Discharge Stable          ED Prescriptions       Medication Sig Dispense Start Date End Date Auth. Provider    ondansetron (ZOFRAN-ODT) 4 MG TbDL Take 1 tablet (4 mg total) by mouth every 6 (six) hours as needed (nausea). 10 tablet 12/7/2024 -- Karly Alejandro FNP          Follow-up Information       Follow up With Specialties Details Why Contact Info    Maru Colbert FNP Family Medicine, Emergency Medicine Go in 2 days  [address] [facility] [place] [id number] [phone]               Karly Alejandro FNP  12/07/24 1906

## 2024-12-08 NOTE — DISCHARGE INSTRUCTIONS
Take zofran as directed if needed for nausea.  Drink plenty of fluids.  Follow up with your primary care provider or [facility].  Return for worsening condition, if not able to tolerate fluids by mouth or emergency needs.

## 2024-12-08 NOTE — ED TRIAGE NOTES
Pt presents to the ED via POV w/ c/o N/v/d that started after pt ate TV dinner at lunchtime today. Pt took dramamine at home with no relief.